# Patient Record
Sex: MALE | URBAN - METROPOLITAN AREA
[De-identification: names, ages, dates, MRNs, and addresses within clinical notes are randomized per-mention and may not be internally consistent; named-entity substitution may affect disease eponyms.]

---

## 2021-08-09 ENCOUNTER — OFFICE VISIT (OUTPATIENT)
Dept: URBAN - METROPOLITAN AREA CLINIC 78 | Facility: CLINIC | Age: 26
End: 2021-08-09

## 2021-10-01 ENCOUNTER — OFFICE VISIT (OUTPATIENT)
Dept: URBAN - METROPOLITAN AREA CLINIC 98 | Facility: CLINIC | Age: 26
End: 2021-10-01

## 2022-01-06 ENCOUNTER — LAB OUTSIDE AN ENCOUNTER (OUTPATIENT)
Dept: URBAN - METROPOLITAN AREA CLINIC 35 | Facility: CLINIC | Age: 27
End: 2022-01-06

## 2022-01-06 ENCOUNTER — OFFICE VISIT (OUTPATIENT)
Dept: URBAN - METROPOLITAN AREA CLINIC 35 | Facility: CLINIC | Age: 27
End: 2022-01-06
Payer: COMMERCIAL

## 2022-01-06 ENCOUNTER — LAB OUTSIDE AN ENCOUNTER (OUTPATIENT)
Dept: URBAN - METROPOLITAN AREA SURGERY CENTER 8 | Facility: SURGERY CENTER | Age: 27
End: 2022-01-06

## 2022-01-06 VITALS
HEIGHT: 65 IN | HEART RATE: 82 BPM | OXYGEN SATURATION: 98 % | DIASTOLIC BLOOD PRESSURE: 78 MMHG | SYSTOLIC BLOOD PRESSURE: 118 MMHG | WEIGHT: 115 LBS | BODY MASS INDEX: 19.16 KG/M2

## 2022-01-06 DIAGNOSIS — K62.5 RECTAL BLEEDING: ICD-10-CM

## 2022-01-06 DIAGNOSIS — D50.0 ANEMIA DUE TO GASTROINTESTINAL BLOOD LOSS: ICD-10-CM

## 2022-01-06 DIAGNOSIS — K86.2 PANCREAS CYST: ICD-10-CM

## 2022-01-06 PROBLEM — 12063002: Status: ACTIVE | Noted: 2022-01-06

## 2022-01-06 PROBLEM — 31258000: Status: ACTIVE | Noted: 2022-01-06

## 2022-01-06 PROBLEM — 413532003: Status: ACTIVE | Noted: 2022-01-06

## 2022-01-06 PROCEDURE — 99243 OFF/OP CNSLTJ NEW/EST LOW 30: CPT | Performed by: INTERNAL MEDICINE

## 2022-01-06 PROCEDURE — 99203 OFFICE O/P NEW LOW 30 MIN: CPT | Performed by: INTERNAL MEDICINE

## 2022-01-06 RX ORDER — SODIUM PICOSULFATE, MAGNESIUM OXIDE, AND ANHYDROUS CITRIC ACID 10; 3.5; 12 MG/160ML; G/160ML; G/160ML
160 ML LIQUID ORAL
Qty: 320 MILLILITER | Refills: 0 | OUTPATIENT
Start: 2022-01-06 | End: 2022-01-07

## 2022-01-06 NOTE — HPI-ABNORMAL FINDINGS
26 year old male patient presents for abnormal findings . [facility] of [facility] and syncope episode while passing stool . Patient  denies any continued abdominal pain but does state he has been having a slight intermittent pain for the past 5 months in the [facility] . He denies nausea or vomiting .    Outside Records: CT Abdomen+Pelvis  (12/22/2021)  IMPRESSION:  1. No significant layering blood products within the colon to account for patients symptoms . Note that single phase of contrast limits evaluation for active colonic bleeding . There is mild linear enhancement along the walls of the anus which can be seen in hemorrhoidal disease . Correlate with physical exam .  2. Interval significant increase in the size of previously seen cystic pancreatic body lesion which could represent an IPMN. Recommend MRI/ MRCP follow up as significant increase in size could suggest aggressive features.  3. Cystic  areas  within the liver have slightly increased in size as compared to prior exam . These lesions are incompletely characterized and statistically represent benign hepatic cysts . These lesions can also be further evaluated on follow up MRI .   MRI Abdomen(12/22/2021)  IMPRESSION:  1. Pancreati neck/body cystic lesion measuring 1.3 cm favoring sidebranch type IPMN without suspicious features . Reccomend continued attention in one year f/u MRI  2. Suggestion of mild diffuse hepatic iron deposition

## 2022-01-06 NOTE — HPI-RECTAL BLEEDING
.... Patient presents today for a consultation about rectal bleeding.  Bleeding started  approximately 2 years ago and happens  anytimes patient eats spicy foods .  The blood is bright red  in color and is present  upon tissue and in toilet.  Pt went to [facility] ER for evaluation and had positive FOBT . He denies rectal exam . Patient currently admits 1-2  bowel movements per day . He denies any mucus,  pruritus ani, rectal pain, or abdominal cramping/pain.  Patient denies trying anything for  relief of symptoms.  Patient denies a previous colonoscopy.

## 2022-01-18 ENCOUNTER — OFFICE VISIT (OUTPATIENT)
Dept: URBAN - METROPOLITAN AREA SURGERY CENTER 8 | Facility: SURGERY CENTER | Age: 27
End: 2022-01-18

## 2022-01-20 ENCOUNTER — OFFICE VISIT (OUTPATIENT)
Dept: URBAN - METROPOLITAN AREA SURGERY CENTER 8 | Facility: SURGERY CENTER | Age: 27
End: 2022-01-20
Payer: COMMERCIAL

## 2022-01-20 ENCOUNTER — CLAIMS CREATED FROM THE CLAIM WINDOW (OUTPATIENT)
Dept: URBAN - METROPOLITAN AREA CLINIC 4 | Facility: CLINIC | Age: 27
End: 2022-01-20
Payer: COMMERCIAL

## 2022-01-20 DIAGNOSIS — K31.89 ACQUIRED DEFORMITY OF DUODENUM: ICD-10-CM

## 2022-01-20 DIAGNOSIS — R93.3 ABN FINDINGS-GI TRACT: ICD-10-CM

## 2022-01-20 DIAGNOSIS — D50.0 ANEMIA: ICD-10-CM

## 2022-01-20 DIAGNOSIS — K62.5 ANAL BLEEDING: ICD-10-CM

## 2022-01-20 DIAGNOSIS — K31.89 DUODENAL ERYTHEMA: ICD-10-CM

## 2022-01-20 PROCEDURE — 43239 EGD BIOPSY SINGLE/MULTIPLE: CPT | Performed by: INTERNAL MEDICINE

## 2022-01-20 PROCEDURE — G8907 PT DOC NO EVENTS ON DISCHARG: HCPCS | Performed by: INTERNAL MEDICINE

## 2022-01-20 PROCEDURE — 88312 SPECIAL STAINS GROUP 1: CPT | Performed by: PATHOLOGY

## 2022-01-20 PROCEDURE — 45378 DIAGNOSTIC COLONOSCOPY: CPT | Performed by: INTERNAL MEDICINE

## 2022-01-20 PROCEDURE — 88305 TISSUE EXAM BY PATHOLOGIST: CPT | Performed by: PATHOLOGY

## 2022-02-03 ENCOUNTER — OFFICE VISIT (OUTPATIENT)
Dept: URBAN - METROPOLITAN AREA CLINIC 35 | Facility: CLINIC | Age: 27
End: 2022-02-03

## 2022-02-03 ENCOUNTER — TELEPHONE ENCOUNTER (OUTPATIENT)
Dept: URBAN - METROPOLITAN AREA CLINIC 33 | Facility: CLINIC | Age: 27
End: 2022-02-03

## 2022-02-03 NOTE — HPI-ABNORMAL FINDINGS
26 year old male patient presents for abnormal findings . Patient states on 12/22 he went to [facility] ER for evaluation of rectal bleeding and syncope episode while passing stool . Patient  denies any continued abdominal pain but does state he has been having a slight intermittent pain for the past 5 months in the [facility] . He denies nausea or vomiting .    Outside Records: CT Abdomen+Pelvis  (12/22/2021)  IMPRESSION:  1. No significant layering blood products within the colon to account for patients symptoms . Note that single phase of contrast limits evaluation for active colonic bleeding . There is mild linear enhancement along the walls of the anus which can be seen in hemorrhoidal disease . Correlate with physical exam .  2. Interval significant increase in the size of previously seen cystic pancreatic body lesion which could represent an IPMN. Recommend MRI/ MRCP follow up as significant increase in size could suggest aggressive features.  3. Cystic  areas  within the liver have slightly increased in size as compared to prior exam . These lesions are incompletely characterized and statistically represent benign hepatic cysts . These lesions can also be further evaluated on follow up MRI .   MRI Abdomen(12/22/2021)  IMPRESSION:  1. Pancreati neck/body cystic lesion measuring 1.3 cm favoring sidebranch type IPMN without suspicious features . Reccomend continued attention in one year f/u MRI  2. Suggestion of mild diffuse hepatic iron deposition

## 2022-02-03 NOTE — HPI-RECTAL BLEEDING
.... Patient presents today for a consultation about rectal bleeding.  Bleeding started  approximately 2 years ago and happens  anytimes patient eats spicy foods .  The blood is bright red  in color and is present  upon tissue and in toilet.  [facility]T . He denies rectal exam . Patient currently admits 1-2  bowel movements per day . He denies any mucus,  pruritus ani, rectal pain, or abdominal cramping/pain.  Patient denies trying anything for  relief of symptoms.  Patient denies a previous colonoscopy.

## 2023-07-05 ENCOUNTER — LAB OUTSIDE AN ENCOUNTER (OUTPATIENT)
Dept: URBAN - METROPOLITAN AREA CLINIC 35 | Facility: CLINIC | Age: 28
End: 2023-07-05

## 2023-07-05 ENCOUNTER — ERX REFILL RESPONSE (OUTPATIENT)
Dept: URBAN - METROPOLITAN AREA CLINIC 35 | Facility: CLINIC | Age: 28
End: 2023-07-05

## 2023-07-05 ENCOUNTER — OFFICE VISIT (OUTPATIENT)
Dept: URBAN - METROPOLITAN AREA CLINIC 35 | Facility: CLINIC | Age: 28
End: 2023-07-05
Payer: COMMERCIAL

## 2023-07-05 VITALS
DIASTOLIC BLOOD PRESSURE: 70 MMHG | BODY MASS INDEX: 19.66 KG/M2 | OXYGEN SATURATION: 99 % | SYSTOLIC BLOOD PRESSURE: 110 MMHG | HEART RATE: 65 BPM | HEIGHT: 65 IN | WEIGHT: 118 LBS

## 2023-07-05 DIAGNOSIS — K64.9 HEMORRHOIDS, UNSPECIFIED HEMORRHOID TYPE: ICD-10-CM

## 2023-07-05 DIAGNOSIS — Z86.2 HISTORY OF ANEMIA: ICD-10-CM

## 2023-07-05 DIAGNOSIS — K60.2 ANAL FISSURE: ICD-10-CM

## 2023-07-05 DIAGNOSIS — K86.2 PANCREAS CYST: ICD-10-CM

## 2023-07-05 DIAGNOSIS — K62.5 RECTAL BLEEDING: ICD-10-CM

## 2023-07-05 PROBLEM — 275538002: Status: ACTIVE | Noted: 2023-07-05

## 2023-07-05 PROCEDURE — 99214 OFFICE O/P EST MOD 30 MIN: CPT | Performed by: STUDENT IN AN ORGANIZED HEALTH CARE EDUCATION/TRAINING PROGRAM

## 2023-07-05 RX ORDER — LIDOCAINE 40 MG/G
1 APPLICATION AS NEEDED TO ANAL AREA CREAM TOPICAL TWICE A DAY
Qty: 30 GRAM | Refills: 0 | OUTPATIENT

## 2023-07-05 NOTE — EXAM-PHYSICAL EXAM
Gen: No acute distress Abdomen: Soft, non-distended, non-tender  Rectal: Small fissure and mild prolapsing hemorrhoid

## 2023-07-05 NOTE — HPI-TODAY'S VISIT:
27 year old patient presents today for a persistant rectal bleeding. Reports symptoms ongoing for years intermittently in episodes. Reports bad period  of symptoms for 5-6 months. Reports having 6-7 BM per day, most of time just blood or gas. Reports bright red. No vomitting. Reports painful to sit down at times. Takes iron  supplements intermittently. Hx mild anemia on prior labs. Denies straining. Reports had similar symptoms in 2021 when underwent colonsocopy, poor prep, IH noted.  No family hx colon cancer or pancreas cancer. He has history pancreas cyst on imaging, most recent MRI 12/2021 with 1.3 cm cyst without otherwise suspicious features noted, favored side branch IPMN.. He was referred to see. Dr. Lujan as well when saw Dr. Tellez, did not schedule. No surveillance imaging since.

## 2023-07-24 ENCOUNTER — CLAIMS CREATED FROM THE CLAIM WINDOW (OUTPATIENT)
Dept: URBAN - METROPOLITAN AREA CLINIC 4 | Facility: CLINIC | Age: 28
End: 2023-07-24
Payer: COMMERCIAL

## 2023-07-24 ENCOUNTER — OUT OF OFFICE VISIT (OUTPATIENT)
Dept: URBAN - METROPOLITAN AREA SURGERY CENTER 8 | Facility: SURGERY CENTER | Age: 28
End: 2023-07-24
Payer: COMMERCIAL

## 2023-07-24 DIAGNOSIS — K63.5 BENIGN COLON POLYP: ICD-10-CM

## 2023-07-24 DIAGNOSIS — K63.89 OTHER SPECIFIED DISEASES OF INTESTINE: ICD-10-CM

## 2023-07-24 DIAGNOSIS — K64.8 EXTERNAL HEMORRHOIDS: ICD-10-CM

## 2023-07-24 DIAGNOSIS — K62.5 ANAL BLEEDING: ICD-10-CM

## 2023-07-24 DIAGNOSIS — Z86.2 HISTORY OF ANEMIA: ICD-10-CM

## 2023-07-24 DIAGNOSIS — K31.89 OTHER DISEASES OF STOMACH AND DUODENUM: ICD-10-CM

## 2023-07-24 PROCEDURE — 88305 TISSUE EXAM BY PATHOLOGIST: CPT | Performed by: PATHOLOGY

## 2023-07-24 PROCEDURE — 45380 COLONOSCOPY AND BIOPSY: CPT | Performed by: STUDENT IN AN ORGANIZED HEALTH CARE EDUCATION/TRAINING PROGRAM

## 2023-07-24 PROCEDURE — G8907 PT DOC NO EVENTS ON DISCHARG: HCPCS | Performed by: STUDENT IN AN ORGANIZED HEALTH CARE EDUCATION/TRAINING PROGRAM

## 2023-07-24 PROCEDURE — 45398 COLONOSCOPY W/BAND LIGATION: CPT | Performed by: STUDENT IN AN ORGANIZED HEALTH CARE EDUCATION/TRAINING PROGRAM

## 2023-07-24 PROCEDURE — 00813 ANES UPR LWR GI NDSC PX: CPT | Performed by: NURSE ANESTHETIST, CERTIFIED REGISTERED

## 2023-07-24 RX ORDER — LIDOCAINE 40 MG/G
1 APPLICATION AS NEEDED TO ANAL AREA CREAM TOPICAL TWICE A DAY
Qty: 30 GRAM | Refills: 0 | Status: ACTIVE | COMMUNITY

## 2023-07-27 ENCOUNTER — TELEPHONE ENCOUNTER (OUTPATIENT)
Dept: URBAN - METROPOLITAN AREA CLINIC 31 | Facility: CLINIC | Age: 28
End: 2023-07-27

## 2023-08-15 ENCOUNTER — DASHBOARD ENCOUNTERS (OUTPATIENT)
Age: 28
End: 2023-08-15

## 2023-08-16 ENCOUNTER — OFFICE VISIT (OUTPATIENT)
Dept: URBAN - METROPOLITAN AREA CLINIC 35 | Facility: CLINIC | Age: 28
End: 2023-08-16

## 2023-08-16 NOTE — HPI-TODAY'S VISIT:
28 Year old male patient presents today for a post colonoscopy follow up. He admits/denies complications following procedure.  Colonoscopy 07/24/2023 - Preparation of the colon was fair. - The examined portion of the ileum was normal. - The rectum, descending colon, transverse colon, ascending colon and cecum are normal. - One 3 mm polyp in the sigmoid colon, removed with a cold biopsy forceps.  Resected and retrieved. - Internal hemorrhoids.  Banded. - Hemorrhoids found on perianal exam  Biopsy: Colon, Sigmoid, Polypectomy: HYPERPLASTIC POLYP(S).    Last visit 07/05/2023 27 year old patient presents today for a persistant rectal bleeding. Reports symptoms ongoing for years intermittently in episodes. Reports bad period  of symptoms for 5-6 months. Reports having 6-7 BM per day, most of time just blood or gas. Reports bright red. No vomitting. Reports painful to sit down at times. Takes iron  supplements intermittently. Hx mild anemia on prior labs. Denies straining. Reports had similar symptoms in 2021 when underwent colonsocopy, poor prep, IH noted.  No family hx colon cancer or pancreas cancer. He has history pancreas cyst on imaging, most recent MRI 12/2021 with 1.3 cm cyst without otherwise suspicious features noted, favored side branch IPMN.. He was referred to see. Dr. Lujan as well when saw Dr. Tellez, did not schedule. No surveillance imaging since.

## 2023-08-23 ENCOUNTER — OFFICE VISIT (OUTPATIENT)
Dept: URBAN - METROPOLITAN AREA CLINIC 35 | Facility: CLINIC | Age: 28
End: 2023-08-23

## 2023-08-24 ENCOUNTER — TELEPHONE ENCOUNTER (OUTPATIENT)
Dept: URBAN - METROPOLITAN AREA CLINIC 35 | Facility: CLINIC | Age: 28
End: 2023-08-24